# Patient Record
Sex: FEMALE | Race: WHITE | Employment: UNEMPLOYED | URBAN - METROPOLITAN AREA
[De-identification: names, ages, dates, MRNs, and addresses within clinical notes are randomized per-mention and may not be internally consistent; named-entity substitution may affect disease eponyms.]

---

## 2019-11-15 ENCOUNTER — OFFICE VISIT (OUTPATIENT)
Dept: OBGYN CLINIC | Facility: CLINIC | Age: 29
End: 2019-11-15
Payer: COMMERCIAL

## 2019-11-15 VITALS
DIASTOLIC BLOOD PRESSURE: 75 MMHG | BODY MASS INDEX: 17.03 KG/M2 | WEIGHT: 85.63 LBS | HEART RATE: 91 BPM | SYSTOLIC BLOOD PRESSURE: 116 MMHG | HEIGHT: 59.5 IN

## 2019-11-15 DIAGNOSIS — N92.6 MISSED MENSES: Primary | ICD-10-CM

## 2019-11-15 PROCEDURE — 81025 URINE PREGNANCY TEST: CPT | Performed by: ADVANCED PRACTICE MIDWIFE

## 2019-11-15 PROCEDURE — 99202 OFFICE O/P NEW SF 15 MIN: CPT | Performed by: ADVANCED PRACTICE MIDWIFE

## 2019-11-15 RX ORDER — CHOLECALCIFEROL (VITAMIN D3) 25 MCG
1 TABLET,CHEWABLE ORAL DAILY
COMMUNITY

## 2019-11-15 NOTE — PROGRESS NOTES
HPI:   Annabelle Smith is a 30year old female who presents for a missed menses visit. Happy about pregnancy. Just got  and pregnant immediately. Surprised it happened so quickly.      Wt Readings from Last 3 Encounters:  11/15/19 : 85 lb 9.6 oz (38. 11.5\" (1.511 m)   Wt 85 lb 9.6 oz (38.8 kg)   LMP 10/09/2019 (Exact Date)   Breastfeeding No   BMI 17.00 kg/m²   GENERAL: well developed, well nourished,in no apparent distress  NEURO: Oriented times three, motor and sensory are grossly intact    ASSESSME

## 2019-11-18 ENCOUNTER — MOBILE ENCOUNTER (OUTPATIENT)
Dept: OBGYN CLINIC | Facility: CLINIC | Age: 29
End: 2019-11-18

## 2019-11-18 ENCOUNTER — HOSPITAL ENCOUNTER (OUTPATIENT)
Dept: ULTRASOUND IMAGING | Facility: HOSPITAL | Age: 29
Discharge: HOME | End: 2019-11-18
Attending: ADVANCED PRACTICE MIDWIFE
Payer: COMMERCIAL

## 2019-11-18 ENCOUNTER — OFFICE VISIT (OUTPATIENT)
Dept: OBGYN CLINIC | Facility: CLINIC | Age: 29
End: 2019-11-18
Payer: COMMERCIAL

## 2019-11-18 ENCOUNTER — APPOINTMENT (OUTPATIENT)
Dept: LAB | Facility: HOSPITAL | Age: 29
End: 2019-11-18
Attending: ADVANCED PRACTICE MIDWIFE
Payer: COMMERCIAL

## 2019-11-18 ENCOUNTER — TELEPHONE (OUTPATIENT)
Dept: OBGYN CLINIC | Facility: CLINIC | Age: 29
End: 2019-11-18

## 2019-11-18 VITALS
SYSTOLIC BLOOD PRESSURE: 105 MMHG | HEIGHT: 59.5 IN | DIASTOLIC BLOOD PRESSURE: 72 MMHG | HEART RATE: 83 BPM | WEIGHT: 85.25 LBS | BODY MASS INDEX: 16.96 KG/M2

## 2019-11-18 DIAGNOSIS — O26.859 SPOTTING IN EARLY PREGNANCY: Primary | ICD-10-CM

## 2019-11-18 DIAGNOSIS — O26.859 SPOTTING IN EARLY PREGNANCY: ICD-10-CM

## 2019-11-18 DIAGNOSIS — O20.9 BLEEDING IN EARLY PREGNANCY: Primary | ICD-10-CM

## 2019-11-18 PROCEDURE — 84702 CHORIONIC GONADOTROPIN TEST: CPT

## 2019-11-18 PROCEDURE — 86900 BLOOD TYPING SEROLOGIC ABO: CPT

## 2019-11-18 PROCEDURE — 99214 OFFICE O/P EST MOD 30 MIN: CPT | Performed by: ADVANCED PRACTICE MIDWIFE

## 2019-11-18 PROCEDURE — 86901 BLOOD TYPING SEROLOGIC RH(D): CPT

## 2019-11-18 PROCEDURE — 36415 COLL VENOUS BLD VENIPUNCTURE: CPT

## 2019-11-18 PROCEDURE — 76817 TRANSVAGINAL US OBSTETRIC: CPT | Performed by: ADVANCED PRACTICE MIDWIFE

## 2019-11-18 PROCEDURE — 76801 OB US < 14 WKS SINGLE FETUS: CPT | Performed by: ADVANCED PRACTICE MIDWIFE

## 2019-11-18 NOTE — TELEPHONE ENCOUNTER
Pt 5wks preg. Started w/ brown spotting last night, amt has increased & now reports cramping. Pt unsure of blood type. Offered appt & pt accepted. Wants to check quant levels.  Pt verbalized an understanding & agrees w/ plan

## 2019-11-18 NOTE — TELEPHONE ENCOUNTER
----- Message from Tanisha Alexis CNM sent at 11/18/2019  3:20 PM CST -----  Regarding: Ultrasound  Please call pt and give her contact info for central scheduling so she can schedule repeat in about a week.  Thanks mj

## 2019-11-19 ENCOUNTER — TELEPHONE (OUTPATIENT)
Dept: OBGYN CLINIC | Facility: CLINIC | Age: 29
End: 2019-11-19

## 2019-11-19 NOTE — TELEPHONE ENCOUNTER
Pt states she talked to her insurance and was told she will need prior authorization for FTS. Advised pt once it is verified she has a viable pregnancy she will come in for nurse ed visit and orders will be placed for FTS.  Prior authorization can then be s

## 2019-11-20 NOTE — PROGRESS NOTES
PC from u/s, u/s with viable IUP, however heart rate 90 bpm. Notified pt of findings. Recommend f/u in 7-10 days to re-evaluate heartbear.

## 2019-11-21 ENCOUNTER — TELEPHONE (OUTPATIENT)
Dept: OBGYN CLINIC | Facility: CLINIC | Age: 29
End: 2019-11-21

## 2019-11-21 NOTE — TELEPHONE ENCOUNTER
----- Message from Brynn Henriquez CNM sent at 11/21/2019  9:59 AM CST -----  Normal vaginal cultures. Please call to inform.

## 2019-11-23 ENCOUNTER — TELEPHONE (OUTPATIENT)
Dept: OBGYN CLINIC | Facility: CLINIC | Age: 29
End: 2019-11-23

## 2019-11-23 NOTE — PROGRESS NOTES
HPI:   Mary Sparrow is a 30year old female who presents for a spotting and cramping which began overnight. No recent intercourse, denies vaginal discharge. Recently .  Here with spouse    Wt Readings from Last 3 Encounters:  11/18/19 : 85 lb 85 lb 4 oz (38.7 kg)   LMP 10/09/2019 (Exact Date)   BMI 16.93 kg/m²   GENERAL: well developed, well nourished,in no apparent distress  LUNGS: normal effort  GI: no masses, hernia or tenderness  :labia intact bilaterally, no lesions, perineum and introit

## 2019-11-25 ENCOUNTER — HOSPITAL ENCOUNTER (OUTPATIENT)
Dept: ULTRASOUND IMAGING | Facility: HOSPITAL | Age: 29
Discharge: HOME | End: 2019-11-25
Attending: ADVANCED PRACTICE MIDWIFE
Payer: COMMERCIAL

## 2019-11-25 ENCOUNTER — TELEPHONE (OUTPATIENT)
Dept: OBGYN CLINIC | Facility: CLINIC | Age: 29
End: 2019-11-25

## 2019-11-25 DIAGNOSIS — O20.9 BLEEDING IN EARLY PREGNANCY: ICD-10-CM

## 2019-11-25 PROCEDURE — 76801 OB US < 14 WKS SINGLE FETUS: CPT | Performed by: ADVANCED PRACTICE MIDWIFE

## 2019-11-25 NOTE — TELEPHONE ENCOUNTER
----- Message from Geovanny Khan CNM sent at 11/25/2019  1:06 PM CST -----  Please let pt know u/s looks good with normal interval growth. HR normal at 123 bpm. She can make nurse ed visit if she has not already done so.  Thanks MJ

## 2019-11-25 NOTE — TELEPHONE ENCOUNTER
Pt was advised her US looked good with normal interval growth. HR is normal at 123 bpm.  Pt is currently traveling to [place] and she will call back to schedule OBN visit when she is home and with her calendar. Pt agrees with plan.

## 2019-12-02 ENCOUNTER — TELEPHONE (OUTPATIENT)
Dept: OBGYN CLINIC | Facility: CLINIC | Age: 29
End: 2019-12-02

## 2019-12-02 NOTE — TELEPHONE ENCOUNTER
Discussed w/ pt that u/s was rescheduled to when she came in for bleeding. Pt states she thought she was to have another u/s. Discussed FTS/NT & 20 wk u/s. Pt desires to have another u/s.  Advised I cannot guarantee insurance will cover another u/s outside

## 2019-12-02 NOTE — TELEPHONE ENCOUNTER
PER PATIENT REQUESTING TO KNOW WHY HER US WAS CANCELLED / WHICH WAS SCHEDULE FOR TOMORROW / PT ALSO STATE SHE'S STILL SPOTTING / Christie Room

## 2019-12-05 ENCOUNTER — NURSE ONLY (OUTPATIENT)
Dept: OBGYN CLINIC | Facility: CLINIC | Age: 29
End: 2019-12-05
Payer: COMMERCIAL

## 2019-12-05 ENCOUNTER — LAB ENCOUNTER (OUTPATIENT)
Dept: LAB | Facility: HOSPITAL | Age: 29
End: 2019-12-05
Attending: ADVANCED PRACTICE MIDWIFE
Payer: COMMERCIAL

## 2019-12-05 VITALS — BODY MASS INDEX: 18.22 KG/M2 | WEIGHT: 90.38 LBS | HEIGHT: 59 IN

## 2019-12-05 DIAGNOSIS — Z34.90 PREGNANCY, UNSPECIFIED GESTATIONAL AGE: ICD-10-CM

## 2019-12-05 DIAGNOSIS — Z34.90 PREGNANCY, UNSPECIFIED GESTATIONAL AGE: Primary | ICD-10-CM

## 2019-12-05 PROCEDURE — 86803 HEPATITIS C AB TEST: CPT

## 2019-12-05 PROCEDURE — 86901 BLOOD TYPING SEROLOGIC RH(D): CPT

## 2019-12-05 PROCEDURE — 86780 TREPONEMA PALLIDUM: CPT

## 2019-12-05 PROCEDURE — 87340 HEPATITIS B SURFACE AG IA: CPT

## 2019-12-05 PROCEDURE — 87086 URINE CULTURE/COLONY COUNT: CPT

## 2019-12-05 PROCEDURE — 86850 RBC ANTIBODY SCREEN: CPT

## 2019-12-05 PROCEDURE — 36415 COLL VENOUS BLD VENIPUNCTURE: CPT

## 2019-12-05 PROCEDURE — 86777 TOXOPLASMA ANTIBODY: CPT

## 2019-12-05 PROCEDURE — 85025 COMPLETE CBC W/AUTO DIFF WBC: CPT

## 2019-12-05 PROCEDURE — 99211 OFF/OP EST MAY X REQ PHY/QHP: CPT | Performed by: ADVANCED PRACTICE MIDWIFE

## 2019-12-05 PROCEDURE — 87389 HIV-1 AG W/HIV-1&-2 AB AG IA: CPT

## 2019-12-05 PROCEDURE — 86762 RUBELLA ANTIBODY: CPT

## 2019-12-05 PROCEDURE — 86900 BLOOD TYPING SEROLOGIC ABO: CPT

## 2019-12-05 NOTE — PROGRESS NOTES
Nurse education complete & information given to pt. Pt has a cat at home (does not clean litterbox), has hx of being tx'd for anxiety & depression 10 years ago. Toxo titers ordered w/ [facility] labs.  Pt states she had labwork done with previous provider & [facility] se

## 2019-12-06 ENCOUNTER — TELEPHONE (OUTPATIENT)
Dept: OBGYN CLINIC | Facility: CLINIC | Age: 29
End: 2019-12-06

## 2019-12-07 PROBLEM — Z34.00 SUPERVISION OF NORMAL FIRST PREGNANCY, ANTEPARTUM: Status: ACTIVE | Noted: 2019-12-07

## 2019-12-30 ENCOUNTER — INITIAL PRENATAL (OUTPATIENT)
Dept: OBGYN CLINIC | Facility: CLINIC | Age: 29
End: 2019-12-30
Payer: COMMERCIAL

## 2019-12-30 VITALS
HEART RATE: 94 BPM | WEIGHT: 94 LBS | DIASTOLIC BLOOD PRESSURE: 68 MMHG | SYSTOLIC BLOOD PRESSURE: 111 MMHG | BODY MASS INDEX: 19 KG/M2

## 2019-12-30 DIAGNOSIS — Z34.81 ENCOUNTER FOR SUPERVISION OF OTHER NORMAL PREGNANCY IN FIRST TRIMESTER: Primary | ICD-10-CM

## 2019-12-30 PROCEDURE — 99212 OFFICE O/P EST SF 10 MIN: CPT | Performed by: ADVANCED PRACTICE MIDWIFE

## 2020-01-08 NOTE — PROGRESS NOTES
Outpatient Maternal-Fetal Medicine First Trimester Screen    Thank you for requesting an ultrasound on your patient Link Butler.   As you are aware she is a 30year old year old female  with a [place] pregnancy an Estimated Date of Del

## 2020-01-09 ENCOUNTER — HOSPITAL ENCOUNTER (OUTPATIENT)
Dept: PERINATAL CARE | Facility: HOSPITAL | Age: 30
Discharge: HOME | End: 2020-01-09
Attending: ADVANCED PRACTICE MIDWIFE
Payer: COMMERCIAL

## 2020-01-09 ENCOUNTER — HOSPITAL ENCOUNTER (OUTPATIENT)
Dept: PERINATAL CARE | Facility: HOSPITAL | Age: 30
Discharge: HOME | End: 2020-01-09
Attending: OBSTETRICS & GYNECOLOGY
Payer: COMMERCIAL

## 2020-01-09 VITALS — HEART RATE: 66 BPM | SYSTOLIC BLOOD PRESSURE: 117 MMHG | DIASTOLIC BLOOD PRESSURE: 75 MMHG

## 2020-01-09 DIAGNOSIS — Z36.9 FIRST TRIMESTER SCREENING: ICD-10-CM

## 2020-01-09 DIAGNOSIS — Z36.9 FIRST TRIMESTER SCREENING: Primary | ICD-10-CM

## 2020-01-09 PROCEDURE — 76813 OB US NUCHAL MEAS 1 GEST: CPT | Performed by: OBSTETRICS & GYNECOLOGY

## 2020-01-09 PROCEDURE — 99201 OFFICE/OUTPT VISIT,NEW,LEVL I: CPT | Performed by: OBSTETRICS & GYNECOLOGY

## 2020-01-13 ENCOUNTER — TELEPHONE (OUTPATIENT)
Dept: PERINATAL CARE | Facility: HOSPITAL | Age: 30
End: 2020-01-13

## 2020-01-13 NOTE — TELEPHONE ENCOUNTER
Recd FTS results and Dr Aurora Zaldivar reviewed and signed off  Spoke with Chichi Moreira and informed her that the probability for Trisomy 13,18  after screening is 1 in > 10,000 and for Trisomy 21 1 in > 10,000. These results within range  Pt verbalized understanding.

## 2020-01-15 ENCOUNTER — PATIENT MESSAGE (OUTPATIENT)
Dept: OBGYN CLINIC | Facility: CLINIC | Age: 30
End: 2020-01-15

## 2020-01-15 ENCOUNTER — OFFICE VISIT (OUTPATIENT)
Dept: INTERNAL MEDICINE CLINIC | Facility: CLINIC | Age: 30
End: 2020-01-15
Payer: COMMERCIAL

## 2020-01-15 VITALS
DIASTOLIC BLOOD PRESSURE: 67 MMHG | SYSTOLIC BLOOD PRESSURE: 100 MMHG | HEART RATE: 79 BPM | BODY MASS INDEX: 19.89 KG/M2 | HEIGHT: 59 IN | WEIGHT: 98.69 LBS

## 2020-01-15 DIAGNOSIS — M79.621 PAIN IN RIGHT UPPER ARM: Primary | ICD-10-CM

## 2020-01-15 PROCEDURE — 99202 OFFICE O/P NEW SF 15 MIN: CPT | Performed by: INTERNAL MEDICINE

## 2020-01-15 PROCEDURE — 99212 OFFICE O/P EST SF 10 MIN: CPT | Performed by: INTERNAL MEDICINE

## 2020-01-15 NOTE — PROGRESS NOTES
Nona Krueger is a 30year old female. Patient presents with:  Arm Pain: right arm    HPI:   For the past 1 week she has had persistent intermittent pain in her right upper arm.   Pain initially began in her right shoulder but now involves her entire 1.0 times per week      Types: Cannabis      Comment: last use 10/20/19       EXAM:   GENERAL: Pleasant female appearing well in no distress  /67 (BP Location: Left arm, Patient Position: Sitting, Cuff Size: adult)   Pulse 79   Ht 4' 11\" (1.499 m)

## 2020-01-15 NOTE — TELEPHONE ENCOUNTER
Advised pt she should see pcp for nonpregnancy related concerns. Pt states she misunderstood & does not have a pcp. Phone # to [facility] given to pt & advised to schedule 1st available.  Pt verbalized an understanding & agrees w/ plan

## 2020-01-15 NOTE — PATIENT INSTRUCTIONS
Please avoid painful activity and apply heat 2-3 times daily. Take Tylenol when needed for pain relief. Call if not soon better.

## 2020-01-27 ENCOUNTER — PATIENT MESSAGE (OUTPATIENT)
Dept: OBGYN CLINIC | Facility: CLINIC | Age: 30
End: 2020-01-27

## 2020-01-27 ENCOUNTER — ROUTINE PRENATAL (OUTPATIENT)
Dept: OBGYN CLINIC | Facility: CLINIC | Age: 30
End: 2020-01-27

## 2020-01-27 VITALS
BODY MASS INDEX: 19.78 KG/M2 | WEIGHT: 98.13 LBS | DIASTOLIC BLOOD PRESSURE: 67 MMHG | HEART RATE: 80 BPM | HEIGHT: 59 IN | SYSTOLIC BLOOD PRESSURE: 100 MMHG

## 2020-01-27 DIAGNOSIS — Z34.82 PRENATAL CARE, SUBSEQUENT PREGNANCY, SECOND TRIMESTER: Primary | ICD-10-CM

## 2020-01-27 LAB
APPEARANCE: CLEAR
MULTISTIX LOT#: NORMAL NUMERIC
PH, URINE: 7 (ref 4.5–8)
SPECIFIC GRAVITY: 1.01 (ref 1–1.03)
URINE-COLOR: YELLOW
UROBILINOGEN,SEMI-QN: 0.2 MG/DL (ref 0–1.9)

## 2020-01-27 PROCEDURE — 99213 OFFICE O/P EST LOW 20 MIN: CPT | Performed by: ADVANCED PRACTICE MIDWIFE

## 2020-01-27 PROCEDURE — 81002 URINALYSIS NONAUTO W/O SCOPE: CPT | Performed by: ADVANCED PRACTICE MIDWIFE

## 2020-01-27 NOTE — PROGRESS NOTES
Moving to [address] [place]. Has one more appt and ultrasound prior to leaving. Discussed weight gain expectations.

## 2020-01-28 NOTE — TELEPHONE ENCOUNTER
I am not certain why this was forwarded to me? Why is she concerned about a short cervix? I do not see anything in her chart about a short cervix. I am not concerned about a D & C causing a short cervix.  We worry more with a surgery on the cervix such as a

## 2020-01-28 NOTE — TELEPHONE ENCOUNTER
Davi Geiger, RN 1/27/2020 2:34 PM CST      ----- Message -----  From: Jonathon Rowan  Sent: 1/27/2020 2:32 PM CST  To: [facility]  Subject: Non-Urgent Medical Question     Hello again,      During my appointment today

## 2020-02-05 ENCOUNTER — APPOINTMENT (OUTPATIENT)
Dept: LAB | Facility: HOSPITAL | Age: 30
End: 2020-02-05
Attending: ADVANCED PRACTICE MIDWIFE
Payer: COMMERCIAL

## 2020-02-05 DIAGNOSIS — Z34.90 PREGNANCY, UNSPECIFIED GESTATIONAL AGE: ICD-10-CM

## 2020-02-05 PROCEDURE — 36415 COLL VENOUS BLD VENIPUNCTURE: CPT

## 2020-02-05 PROCEDURE — 82105 ALPHA-FETOPROTEIN SERUM: CPT

## 2020-02-07 LAB
AFP SMOKING: NO
FAMILY HX NEURAL TUBE DEFECT: NO
INSULIN REQ MATERNAL DIABETES: NO
MATERNAL AGE OF DELIVERY: 32.2 YR
MOM FOR AFP: 0.8
PATIENT'S AFP: 39 NG/ML

## 2020-02-23 NOTE — PROGRESS NOTES
/63   Pulse 77   Ht 4' 11\" (1.499 m)   Wt 98 lb (44.5 kg)   LMP 10/09/2019 (Exact Date)   BMI 19.79 kg/m²      STANDARD OBSTETRIC ULTRASOUND REPORT   See imaging tab for complete consultation / ultrasound report      Fetal Heart Rate: Present 144 bp

## 2020-02-24 ENCOUNTER — HOSPITAL ENCOUNTER (OUTPATIENT)
Dept: PERINATAL CARE | Facility: HOSPITAL | Age: 30
Discharge: HOME | End: 2020-02-24
Attending: OBSTETRICS & GYNECOLOGY
Payer: COMMERCIAL

## 2020-02-24 ENCOUNTER — ROUTINE PRENATAL (OUTPATIENT)
Dept: OBGYN CLINIC | Facility: CLINIC | Age: 30
End: 2020-02-24
Payer: COMMERCIAL

## 2020-02-24 ENCOUNTER — HOSPITAL ENCOUNTER (OUTPATIENT)
Dept: PERINATAL CARE | Facility: HOSPITAL | Age: 30
Discharge: HOME | End: 2020-02-24
Attending: ADVANCED PRACTICE MIDWIFE
Payer: COMMERCIAL

## 2020-02-24 VITALS
DIASTOLIC BLOOD PRESSURE: 63 MMHG | WEIGHT: 98 LBS | HEART RATE: 77 BPM | BODY MASS INDEX: 19.76 KG/M2 | HEIGHT: 59 IN | SYSTOLIC BLOOD PRESSURE: 104 MMHG

## 2020-02-24 VITALS
DIASTOLIC BLOOD PRESSURE: 65 MMHG | WEIGHT: 104.25 LBS | HEART RATE: 74 BPM | BODY MASS INDEX: 21.02 KG/M2 | HEIGHT: 59 IN | SYSTOLIC BLOOD PRESSURE: 104 MMHG

## 2020-02-24 DIAGNOSIS — Z34.00 SUPERVISION OF NORMAL FIRST PREGNANCY, ANTEPARTUM: ICD-10-CM

## 2020-02-24 DIAGNOSIS — Z36.3 ENCOUNTER FOR ANTENATAL SCREENING FOR MALFORMATION USING ULTRASOUND: Primary | ICD-10-CM

## 2020-02-24 DIAGNOSIS — Z34.82 PRENATAL CARE, SUBSEQUENT PREGNANCY, SECOND TRIMESTER: Primary | ICD-10-CM

## 2020-02-24 DIAGNOSIS — Z36.3 ENCOUNTER FOR ANTENATAL SCREENING FOR MALFORMATION USING ULTRASOUND: ICD-10-CM

## 2020-02-24 LAB
APPEARANCE: CLEAR
MULTISTIX LOT#: NORMAL NUMERIC
PH, URINE: 7 (ref 4.5–8)
SPECIFIC GRAVITY: 1 (ref 1–1.03)
URINE-COLOR: YELLOW
UROBILINOGEN,SEMI-QN: 0.2 MG/DL (ref 0–1.9)

## 2020-02-24 PROCEDURE — 99211 OFF/OP EST MAY X REQ PHY/QHP: CPT | Performed by: OBSTETRICS & GYNECOLOGY

## 2020-02-24 PROCEDURE — 81002 URINALYSIS NONAUTO W/O SCOPE: CPT | Performed by: ADVANCED PRACTICE MIDWIFE

## 2020-02-24 PROCEDURE — 76805 OB US >/= 14 WKS SNGL FETUS: CPT | Performed by: OBSTETRICS & GYNECOLOGY
